# Patient Record
Sex: MALE | Race: WHITE | NOT HISPANIC OR LATINO | Employment: FULL TIME | URBAN - METROPOLITAN AREA
[De-identification: names, ages, dates, MRNs, and addresses within clinical notes are randomized per-mention and may not be internally consistent; named-entity substitution may affect disease eponyms.]

---

## 2017-08-03 ENCOUNTER — OFFICE VISIT - HEALTHEAST (OUTPATIENT)
Dept: FAMILY MEDICINE | Facility: CLINIC | Age: 27
End: 2017-08-03

## 2017-08-03 DIAGNOSIS — M25.512 LEFT SHOULDER PAIN: ICD-10-CM

## 2017-08-03 DIAGNOSIS — G47.33 OBSTRUCTIVE SLEEP APNEA: ICD-10-CM

## 2017-08-03 ASSESSMENT — MIFFLIN-ST. JEOR: SCORE: 1997.93

## 2017-10-09 ENCOUNTER — AMBULATORY - HEALTHEAST (OUTPATIENT)
Dept: NURSING | Facility: CLINIC | Age: 27
End: 2017-10-09

## 2017-10-09 DIAGNOSIS — Z23 IMMUNIZATION DUE: ICD-10-CM

## 2018-10-29 ENCOUNTER — COMMUNICATION - HEALTHEAST (OUTPATIENT)
Dept: FAMILY MEDICINE | Facility: CLINIC | Age: 28
End: 2018-10-29

## 2018-10-29 ENCOUNTER — OFFICE VISIT - HEALTHEAST (OUTPATIENT)
Dept: FAMILY MEDICINE | Facility: CLINIC | Age: 28
End: 2018-10-29

## 2018-10-29 DIAGNOSIS — E66.9 OBESE: ICD-10-CM

## 2018-10-29 DIAGNOSIS — Z00.00 ROUTINE GENERAL MEDICAL EXAMINATION AT A HEALTH CARE FACILITY: ICD-10-CM

## 2018-10-29 LAB
ALBUMIN SERPL-MCNC: 4.1 G/DL (ref 3.5–5)
ALP SERPL-CCNC: 54 U/L (ref 45–120)
ALT SERPL W P-5'-P-CCNC: 35 U/L (ref 0–45)
ANION GAP SERPL CALCULATED.3IONS-SCNC: 11 MMOL/L (ref 5–18)
AST SERPL W P-5'-P-CCNC: 23 U/L (ref 0–40)
BILIRUB SERPL-MCNC: 0.5 MG/DL (ref 0–1)
BUN SERPL-MCNC: 15 MG/DL (ref 8–22)
CALCIUM SERPL-MCNC: 9.9 MG/DL (ref 8.5–10.5)
CHLORIDE BLD-SCNC: 103 MMOL/L (ref 98–107)
CHOLEST SERPL-MCNC: 149 MG/DL
CO2 SERPL-SCNC: 27 MMOL/L (ref 22–31)
CREAT SERPL-MCNC: 0.99 MG/DL (ref 0.7–1.3)
FASTING STATUS PATIENT QL REPORTED: NO
GFR SERPL CREATININE-BSD FRML MDRD: >60 ML/MIN/1.73M2
GLUCOSE BLD-MCNC: 87 MG/DL (ref 70–125)
HBA1C MFR BLD: 5.4 % (ref 3.5–6)
HDLC SERPL-MCNC: 38 MG/DL
LDLC SERPL CALC-MCNC: 78 MG/DL
POTASSIUM BLD-SCNC: 3.7 MMOL/L (ref 3.5–5)
PROT SERPL-MCNC: 7.1 G/DL (ref 6–8)
SODIUM SERPL-SCNC: 141 MMOL/L (ref 136–145)
TRIGL SERPL-MCNC: 163 MG/DL
TSH SERPL DL<=0.005 MIU/L-ACNC: 1.03 UIU/ML (ref 0.3–5)

## 2018-10-29 ASSESSMENT — MIFFLIN-ST. JEOR: SCORE: 2013.6

## 2019-09-24 ENCOUNTER — OFFICE VISIT - HEALTHEAST (OUTPATIENT)
Dept: FAMILY MEDICINE | Facility: CLINIC | Age: 29
End: 2019-09-24

## 2019-09-24 DIAGNOSIS — Z13.220 LIPID SCREENING: ICD-10-CM

## 2019-09-24 DIAGNOSIS — Z13.1 ENCOUNTER FOR SCREENING EXAMINATION FOR IMPAIRED GLUCOSE REGULATION AND DIABETES MELLITUS: ICD-10-CM

## 2019-09-24 DIAGNOSIS — R03.0 ELEVATED BLOOD PRESSURE READING WITHOUT DIAGNOSIS OF HYPERTENSION: ICD-10-CM

## 2019-09-24 DIAGNOSIS — Z23 IMMUNIZATION DUE: ICD-10-CM

## 2019-09-24 DIAGNOSIS — Z00.00 ANNUAL PHYSICAL EXAM: ICD-10-CM

## 2019-09-24 LAB
CHOLEST SERPL-MCNC: 175 MG/DL
FASTING STATUS PATIENT QL REPORTED: YES
FASTING STATUS PATIENT QL REPORTED: YES
GLUCOSE BLD-MCNC: 78 MG/DL (ref 70–99)
HDLC SERPL-MCNC: 44 MG/DL
LDLC SERPL CALC-MCNC: 107 MG/DL
TRIGL SERPL-MCNC: 118 MG/DL

## 2019-09-24 ASSESSMENT — MIFFLIN-ST. JEOR: SCORE: 2035.63

## 2019-10-09 ENCOUNTER — AMBULATORY - HEALTHEAST (OUTPATIENT)
Dept: NURSING | Facility: CLINIC | Age: 29
End: 2019-10-09

## 2019-10-09 DIAGNOSIS — I10 BENIGN ESSENTIAL HYPERTENSION: ICD-10-CM

## 2019-10-18 ENCOUNTER — COMMUNICATION - HEALTHEAST (OUTPATIENT)
Dept: FAMILY MEDICINE | Facility: CLINIC | Age: 29
End: 2019-10-18

## 2020-07-01 ENCOUNTER — AMBULATORY - HEALTHEAST (OUTPATIENT)
Dept: FAMILY MEDICINE | Facility: CLINIC | Age: 30
End: 2020-07-01

## 2020-07-01 ENCOUNTER — COMMUNICATION - HEALTHEAST (OUTPATIENT)
Dept: FAMILY MEDICINE | Facility: CLINIC | Age: 30
End: 2020-07-01

## 2020-07-01 DIAGNOSIS — R21 RASH: ICD-10-CM

## 2021-01-04 ENCOUNTER — AMBULATORY - HEALTHEAST (OUTPATIENT)
Dept: NURSING | Facility: CLINIC | Age: 31
End: 2021-01-04

## 2021-01-25 ENCOUNTER — OFFICE VISIT - HEALTHEAST (OUTPATIENT)
Dept: FAMILY MEDICINE | Facility: CLINIC | Age: 31
End: 2021-01-25

## 2021-01-25 DIAGNOSIS — G47.33 OBSTRUCTIVE SLEEP APNEA: ICD-10-CM

## 2021-01-25 DIAGNOSIS — Z02.4 ENCOUNTER FOR CDL (COMMERCIAL DRIVING LICENSE) EXAM: ICD-10-CM

## 2021-01-25 DIAGNOSIS — I10 BENIGN ESSENTIAL HYPERTENSION: ICD-10-CM

## 2021-01-25 LAB
ALBUMIN UR-MCNC: NEGATIVE MG/DL
APPEARANCE UR: CLEAR
BILIRUB UR QL STRIP: NEGATIVE
COLOR UR AUTO: YELLOW
GLUCOSE UR STRIP-MCNC: NEGATIVE MG/DL
HGB UR QL STRIP: NEGATIVE
KETONES UR STRIP-MCNC: NEGATIVE MG/DL
LEUKOCYTE ESTERASE UR QL STRIP: NEGATIVE
NITRATE UR QL: NEGATIVE
PH UR STRIP: 5 [PH] (ref 5–8)
SP GR UR STRIP: >=1.03 (ref 1–1.03)
UROBILINOGEN UR STRIP-ACNC: NORMAL

## 2021-01-25 RX ORDER — LISINOPRIL 10 MG/1
10 TABLET ORAL DAILY
Qty: 30 TABLET | Refills: 11 | Status: SHIPPED | OUTPATIENT
Start: 2021-01-25 | End: 2022-08-08

## 2021-01-25 ASSESSMENT — MIFFLIN-ST. JEOR: SCORE: 2040.57

## 2021-05-26 VITALS — DIASTOLIC BLOOD PRESSURE: 88 MMHG | SYSTOLIC BLOOD PRESSURE: 142 MMHG

## 2021-05-27 ENCOUNTER — RECORDS - HEALTHEAST (OUTPATIENT)
Dept: ADMINISTRATIVE | Facility: CLINIC | Age: 31
End: 2021-05-27

## 2021-05-31 VITALS — WEIGHT: 229 LBS | BODY MASS INDEX: 32.06 KG/M2 | HEIGHT: 71 IN

## 2021-06-01 NOTE — PROGRESS NOTES
"Former[facility] Note    Name: Yoel Hughes  : 1990   MRN: [id number]    Yoel Hughes is a 29 y.o. male presenting to discuss the following:     CC:   Chief Complaint   Patient presents with     Biometric Screen     Fasting for labs.       HPI:  Yoel is here today for symmetric screening and annual physical exam.  He has no concerns.    Blood pressure slightly elevated today.  He has a history of sleep apnea, does not use his CPAP machine.  Denies any history of hypertension.    He quit to tobacco back in April.    HEALTH MAINTENANCE:   - flu shot: We will do flu shot today  - HIV screening: Declines HIV screening    ROS:   See HPI above. Remaining 14 point ROS negative.     PMH:   Patient Active Problem List   Diagnosis     Obstructive sleep apnea       No past medical history on file.    PSH:   No past surgical history on file.      MEDICATIONS:   No current outpatient medications on file prior to visit.     No current facility-administered medications on file prior to visit.        ALLERGIES:  No Known Allergies    FAMHx:  Family History   Problem Relation Age of Onset     Clotting disorder Father      Factor V Leiden deficiency Father      Colon cancer Maternal Grandmother      Cancer Maternal Grandfather      Lung cancer Maternal Grandfather        SOCIAL HISTORY:   Social History     Tobacco Use     Smoking status: Never Smoker     Smokeless tobacco: Former User     Types: Chew   Substance Use Topics     Alcohol use: Not on file     Drug use: Not on file       PHYSICAL EXAM:   /90   Pulse 88   Temp 97.8  F (36.6  C) (Oral)   Resp 16   Ht 5' 10.5\" (1.791 m)   Wt (!) 237 lb 5 oz (107.6 kg)   BMI 33.57 kg/m     GENERAL: Yoel is a pleasant, well appearing male, in no acute distress.   HEENT: Sclera white, no nasal discharge, oropharynx pink and moist, no cervical lymphadenopathy, no thyromegaly.   HEART: Regular rate and rhythm, no murmurs.   LUNGS: Clear to auscultation " bilaterally, unlabored.   ABDOMEN: Soft, non-tender to palpation.     ASSESSMENT & PLAN:   Yoel Hughes is a 29 y.o. male presenting today for annual physical exam / biometric screening.    1. Annual physical exam  Reviewed health history.  Reviewed health maintenance recommendations.    2. Elevated blood pressure reading without diagnosis of hypertension  Blood pressure is slightly elevated today.  He will return in 2 weeks for nurse only blood pressure check.  May be related to untreated sleep apnea.    3. Lipid screening  - Lipid [place] FASTING    4. Encounter for screening examination for impaired glucose regulation and diabetes mellitus  - Glucose    5. Immunization due  - Influenza, Seasonal Quad, PF =/> 6months     HM: Declined HIV screening    RTC: 2 weeks - nurse only BP check / 1 year - annual physical exam     Cielo Nur, DO

## 2021-06-01 NOTE — PROGRESS NOTES
Please call Duane,  His labs have returned and everything looks good. We will fax over the form.   Cielo Nur, DO

## 2021-06-02 VITALS — HEIGHT: 71 IN | BODY MASS INDEX: 32.48 KG/M2 | WEIGHT: 232 LBS

## 2021-06-02 NOTE — PROGRESS NOTES
I met with Yoel Hughes at the request of Dr. Nur  to recheck his blood pressure.  Blood pressure medications on the MAR were reviewed with patient.    Patient has taken all medications as per usual regimen: N/A  Patient reports tolerating them without any issues or concerns: N/A    Vitals:    10/09/19 0912 10/09/19 0922   BP: (!) 152/94 142/88       After 5 minutes, the patient's blood pressure remained greater than or equal to 140/90.    Is the patient currently having any chest pain?  No  Does the patient currently have a headache?   No  Does the patient currently have any vision changes? No  Does the patient currently have any nausea? No  Does the patient currently have any abdominal pain? No    The previous encounter was reviewed.  The patient was discharged and the note will be sent to the provider for final review.

## 2021-06-02 NOTE — TELEPHONE ENCOUNTER
----- Message from Cielo Nur DO sent at 10/18/2019  7:36 AM CDT -----  Please call Yoel.   Thank you for coming back into the clinic to recheck your blood pressure.  Your blood pressure does continue to be elevated and I recommend starting a blood pressure lowering medication.  I have sent in the medication to your pharmacy to start taking daily to help lower your blood pressure.  This medication is called lisinopril.  I recommend returning in 1 month for another nurse only appointment to recheck your blood pressure.  We should also check your kidney function and electrolytes at that time to make sure they are still in the normal range.  Please let me know if you have any other questions or concerns.  I will place an order for that future lab appointment.  Cielo Nur DO  ----- Message -----  From: Lenora Hyman CMA  Sent: 10/9/2019   9:24 AM CDT  To: Cielo Nur DO

## 2021-06-02 NOTE — TELEPHONE ENCOUNTER
Called pt and relayed Dr. Nur's message below.  Pt was ok with calling back in to schedule a nurse only and lab only appt once he starts taking the Lisinopril. Pt was notified that the prescription was sent to his pharmacy.

## 2021-06-03 VITALS
SYSTOLIC BLOOD PRESSURE: 134 MMHG | HEART RATE: 88 BPM | HEIGHT: 71 IN | WEIGHT: 237.31 LBS | RESPIRATION RATE: 16 BRPM | BODY MASS INDEX: 33.22 KG/M2 | TEMPERATURE: 97.8 F | DIASTOLIC BLOOD PRESSURE: 90 MMHG

## 2021-06-05 VITALS
HEART RATE: 77 BPM | BODY MASS INDEX: 33.38 KG/M2 | DIASTOLIC BLOOD PRESSURE: 90 MMHG | RESPIRATION RATE: 16 BRPM | WEIGHT: 238.4 LBS | HEIGHT: 71 IN | SYSTOLIC BLOOD PRESSURE: 144 MMHG

## 2021-06-09 NOTE — TELEPHONE ENCOUNTER
Called and reviewed with patient.  He has had a recent rash on his trunk and on his extremities.  He states that this can be mildly itchy.  He was doing a lot of yard work recently.  He denies fever, chills, or blisters.  Recommend a trial of topical triamcinolone.  Discussed that this could represent a fungal infection as well.  May need to consider further evaluation or treatment if this persists.  He agrees to plan.

## 2021-06-12 NOTE — PROGRESS NOTES
Assessment/ Plan     1. Left shoulder pain  Consider possible rotator cuff tendinitis  Consider also possible cervical radiculopathy    Recommend conservative measures initially  He may take ibuprofen 600 mg-800 mg up to 3 times a day in the short-term  He may apply ice or cool packs  Recommend minimizing activities such as lifting his arm above his head  If not improving consider course of physical therapy.  If still not improving then he may need referral to orthopedics or possibly further evaluation with an MRI    2. Obstructive sleep apnea    Recommend that records be forwarded  Can consider CPAP as needed    3. Family history of blood clots and Factor V Leiden trait.    Offered to check a factor V Leiden test in the future      Subjective:       Yoel Hughes is a 27 y.o. male who presents to the clinic to establish care.  Essentially, he has had at least a one-month history of pain in the left shoulder.  He notes that he has had some pain when elevating his left arm above his head.  There are times that when sleeping he will have an increased discomfort, especially when lying on his left side.  He is quite active in his job as a .  He will have occasional clicking in his left shoulder.  Also, he has had occasional numbness and tingling involving the left fourth finger.  He cannot think of a specific injury.  He has taken Tylenol for some of the discomfort.  Past medical history is notable for previous bilateral arm fractures.  He has history of obstructive sleep apnea.  He is not currently taking medications.  He denies known allergies.  Social history is notable for the fact that he is  and expecting his first child.  He works as a  as noted.    The following portions of the patient's history were reviewed and updated as appropriate: allergies, current medications, past family history, past medical history, past social history, past surgical history and problem list.    Review of  "Systems   A 12 point comprehensive review of systems was negative except as noted.      No current outpatient prescriptions on file.     No current facility-administered medications for this visit.        Objective:      /82  Pulse 68  Temp 98.3  F (36.8  C) (Oral)   Resp 20  Ht 5' 10.5\" (1.791 m)  Wt (!) 229 lb (103.9 kg)  BMI 32.39 kg/m2      General appearance: alert, appears stated age and cooperative  Head: Normocephalic, without obvious abnormality, atraumatic  Throat: lips, mucosa, and tongue normal; teeth and gums normal  Lungs: clear to auscultation bilaterally  Heart: regular rate and rhythm, S1, S2 normal, no murmur, click, rub or gallop  Extremities: extremities normal, atraumatic, no cyanosis or edema   Examination left shoulder reveals normal range of motion  He has some pain with left arm abduction  There are no obvious impingement signs  Lymph nodes: Cervical, supraclavicular, and axillary nodes normal.  Neurologic: Alert and oriented X 3, normal strength and tone.Normal coordination and gait         No results found for this or any previous visit (from the past 168 hour(s)).       This note has been dictated using voice recognition software. Any grammatical or context distortions are unintentional and inherent to the software  "

## 2021-06-14 NOTE — PROGRESS NOTES
Medical Examination      Assessment/Plan     1. Encounter for CDL (commercial driving license) exam  Tatiana meets criteria for a 1 year recertification due to hypertension and sleep apnea.  Appropriate forms are filled out and he is logged onto the national registry  - Urinalysis- if Indicated    2. Benign essential hypertension  He has not been taking his blood pressure medication.  Discussed he would have much better outcomes long-term if he treated his blood pressure.  Also, he needs to have this under better control for his DOT.  - lisinopriL (PRINIVIL,ZESTRIL) 10 MG tablet; Take 1 tablet (10 mg total) by mouth daily.  Dispense: 30 tablet; Refill: 11    3. Obstructive sleep apnea  He has a history of sleep apnea and is not using CPAP.  I discussed with him the importance of keeping his DOT and being compliant with CPAP.  We will give him a 1 year certification but he needs to reestablish with a sleep medicine physician and get started on CPAP if that is what they recommended.  I discussed he needs to show 70% compliance with CPAP at his next recertification.  - Ambulatory referral to Neurology  Subjective:      Yoel Hughes is a 30 y.o. male who presents today for a  fitness determination physical exam.  He is new to me today.  She has a history of hypertension and was given a prescription for lisinopril which he never filled.  He states he was nervous about taking medication.  He does have a family history of hypertension.  I discussed with him the importance of treating hypertension over time and specifically with his DOT.  He also was diagnosed with sleep apnea about 5 years ago.  He was told it was mild but they did recommend CPAP which she did wear for short period of time.  He states he does snore fairly loudly.  He does have some daytime sleepiness but does not feel that it severe.  I discussed with him the guidelines through the DOT with a diagnosis of sleep apnea  "having to use CPAP at least 70% of the time.  He agrees to go back and see the sleep specialist that initially did his evaluation to either get set up on CPAP or have any test.  The following portions of the patient's history were reviewed and updated as appropriate: allergies, current medications, past family history, past medical history, past social history, past surgical history and problem list.  Review of Systems  A 12 point comprehensive review of systems was negative except as noted.     Objective:      Vision:   Uncorrected Corrected Horizontal Field of Vision   Right Eye 20/25  170 degrees   Left Eye  20/20  170 degrees   Both Eyes  20/16       Applicant can recognize and distinguish among traffic control signals and devices showing standard red, green, and maynor colors.         Monocular Vision?: No      Hearin Hz 1000 Hz 2000 Hz 4000 Hz   Right Ear  hears 25 dB hears 20 dB hears 20 dB no response at 40 dB   Left Ear  hears 25 dB hears 20 dB hears 20 dB hears 20 dB          /90   Pulse 77   Resp 16   Ht 5' 10.5\" (1.791 m)   Wt (!) 238 lb 6.4 oz (108.1 kg)   BMI 33.72 kg/m      General Appearance:    Alert, cooperative, no distress, appears stated age   Head:    Normocephalic, without obvious abnormality, atraumatic   Eyes:    PERRL, conjunctiva/corneas clear, EOM's intact, fundi     benign, both eyes        Ears:    Normal TM's and external ear canals, both ears   Nose:   Nares normal, septum midline, mucosa normal, no drainage    or sinus tenderness   Throat:   Lips, mucosa, and tongue normal; teeth and gums normal   Neck:   Supple, symmetrical, trachea midline, no adenopathy;        thyroid:  No enlargement/tenderness/nodules; no carotid    bruit or JVD   Back:     Symmetric, no curvature, ROM normal, no CVA tenderness   Lungs:     Clear to auscultation bilaterally, respirations unlabored   Chest wall:    No tenderness or deformity   Heart:    Regular rate and rhythm, S1 and S2 " normal, no murmur, rub   or gallop   Abdomen:     Soft, non-tender, bowel sounds active all four quadrants,     no masses, no organomegaly           Extremities:   Extremities normal, atraumatic, no cyanosis or edema   Pulses:   2+ and symmetric all extremities   Skin:   Skin color, texture, turgor normal, no rashes or lesions   Lymph nodes:   Cervical, supraclavicular, and axillary nodes normal   Neurologic:   CNII-XII intact. Normal strength, sensation and reflexes       throughout       Labs:  No results found for: SPECGRAV, PROTEINUR, BILIRUBINUR, GLUCOSEU

## 2021-06-16 PROBLEM — I10 BENIGN ESSENTIAL HYPERTENSION: Status: ACTIVE | Noted: 2021-01-25

## 2021-06-16 PROBLEM — G47.33 OBSTRUCTIVE SLEEP APNEA: Status: ACTIVE | Noted: 2017-08-03

## 2021-06-16 PROBLEM — R03.0 ELEVATED BLOOD PRESSURE READING WITHOUT DIAGNOSIS OF HYPERTENSION: Status: ACTIVE | Noted: 2019-09-24

## 2021-06-21 NOTE — PROGRESS NOTES
"[facility] Clinic Note    Patient Name: Yoel Hughes  Patient Age: 28 y.o.  YOB: 1990  MRN: [id number]    Date of visit: 10/29/2018    Patient Active Problem List   Diagnosis     Obstructive sleep apnea     Social History     Social History Narrative     Family History   Problem Relation Age of Onset     Clotting disorder Father      Factor V Leiden deficiency Father      Colon cancer Maternal Grandmother      Cancer Maternal Grandfather      Lung cancer Maternal Grandfather      No past surgical history on file.  No outpatient encounter prescriptions on file as of 10/29/2018.     No facility-administered encounter medications on file as of 10/29/2018.        Chief Complaint:   Chief Complaint   Patient presents with     Annual Exam       HPI:   Occupation: facility  Marital status:  Number of children:1  Living situation:3  Diet:not great  Exercise:2 hockey a week.    Alcohol use:not often  Tobacco use:chews, weaning off.    Illicit drug use:no  Depression:ok  Last tetanus:2017  HIV testing:never had  Lipids/glucose:ok  Blood pressure:ok  Mother/Father/Siblings MI:no  Fam hx colon or prostate cancer or kidney disease:gma colon cancer  Spirituality:no  Last visit to dentist:been awhile  Optometrist:vision test every 2 years    Urine ok          Wt Readings from Last 3 Encounters:   10/29/18 (!) 232 lb (105.2 kg)   08/03/17 (!) 229 lb (103.9 kg)     BP Readings from Last 3 Encounters:   10/29/18 130/78   08/03/17 130/82       ROS: Pertinent ros findings in hpi, all other systems negative.  Objective/Physical Exam:     /78  Pulse 68  Temp 97.7  F (36.5  C) (Oral)   Resp 12  Ht 5' 10.63\" (1.794 m)  Wt (!) 232 lb (105.2 kg)  BMI 32.7 kg/m2    Gen: NAD, conversant, appears age, well-kempt  Skin: warm, dry, no rash, pallor cyanosis  HENT: normocephalic atraumatic, MMM, no oral lesions, otorrhea, rhinorrhea. TM's normal bilaterally.  Eyes: non-icteric, extra-ocular movements " intact, PERRL, conjunctivae not injected. Holding eyes open comfortably, no drainage.  CV: NRRR no m/r/g, no peripheral edema. no JVD.  Resp: CTAB no w/r/r, normal respiratory effort  GI: soft, non-tender, non-distended. No masses.  MSK: no muscle or joint swelling.  Neuro: no dysarthria or gross asymmetry  Psych: full affect, oriented x 3  Lymph: No significant cervical lymphadenopathy  Hematologic: No petechiae or purpura.  gu ok    Has some tooth decay.  Receding gums.  No evidence of cancer.  Assessment/Plan:  No results found for this or any previous visit (from the past 24 hour(s)).    Wt Readings from Last 3 Encounters:   10/29/18 (!) 232 lb (105.2 kg)   08/03/17 (!) 229 lb (103.9 kg)     BP Readings from Last 3 Encounters:   10/29/18 130/78   08/03/17 130/82       PHQ-2 Total Score: 0 (10/29/2018  9:46 AM)  No Data Recorded          ICD-10-CM    1. Routine general medical examination at a health care facility Z00.00 Glycosylated Hemoglobin A1c     Lipid [place]     Comprehensive Metabolic Panel     Thyroid Stimulating Hormone (TSH)   2. Obese E66.9 Glycosylated Hemoglobin A1c     Lipid [place]     Comprehensive Metabolic Panel     Thyroid Stimulating Hormone (TSH)     No medications were ordered this encounter  We discussed going to the dentist, weight loss, stopping chewing tobacco. Using cpap    Patient Instructions   Plant Based Diet Handout    Eat abundant vegetables.    Only eat whole grains.    2-4 fruits/day.    Enjoy at least 2 servings of legumes (beans) or tofu daily.      Avoid animal products such as dairy, eggs and meat. (Replace these with 1,000mcg vitamin B12 and a calcium/vitamin D supplement such as Caltrate+D3 daily.)    Avoid processed foods, sugars and oils.    Cook your own food as much as possible.    Keep a food diary and ask someone else to diet with you.    Drink 8 glasses of water a day.    [place] [employer] Documentary - watch online.        Counseled patient regarding healthy  lifestyle including exercise, healthy eating. I recommend seeing optometrist and dentist regularly.    Counseled patient regarding treatments, treatment options, risks and benefits and diagnosis.  The patient was interactive, attentive, verbalized understanding, and we discussed plan.     Vj Tellez MD

## 2021-07-03 NOTE — ADDENDUM NOTE
Addendum Note by Fareed Nur DO at 10/9/2019  9:00 AM     Author: Fareed Nur DO Service: -- Author Type: Physician    Filed: 10/18/2019  7:38 AM Encounter Date: 10/9/2019 Status: Signed    : Fareed Nur DO (Physician)    Addended by: FAREED NUR on: 10/18/2019 07:38 AM        Modules accepted: Orders

## 2021-07-03 NOTE — ADDENDUM NOTE
Addendum Note by Fareed Nur DO at 10/9/2019  9:00 AM     Author: Fareed Nur DO Service: -- Author Type: Physician    Filed: 10/18/2019  7:36 AM Encounter Date: 10/9/2019 Status: Signed    : Fareed Nur DO (Physician)    Addended by: FAREED NUR on: 10/18/2019 07:36 AM        Modules accepted: Orders

## 2022-03-04 ENCOUNTER — TRANSFERRED RECORDS (OUTPATIENT)
Dept: HEALTH INFORMATION MANAGEMENT | Facility: CLINIC | Age: 32
End: 2022-03-04

## 2022-03-22 ENCOUNTER — TRANSFERRED RECORDS (OUTPATIENT)
Dept: HEALTH INFORMATION MANAGEMENT | Facility: CLINIC | Age: 32
End: 2022-03-22

## 2022-08-01 ENCOUNTER — OFFICE VISIT (OUTPATIENT)
Dept: FAMILY MEDICINE | Facility: CLINIC | Age: 32
End: 2022-08-01
Payer: COMMERCIAL

## 2022-08-01 VITALS
OXYGEN SATURATION: 96 % | WEIGHT: 226 LBS | HEIGHT: 71 IN | TEMPERATURE: 98.3 F | HEART RATE: 73 BPM | SYSTOLIC BLOOD PRESSURE: 142 MMHG | DIASTOLIC BLOOD PRESSURE: 82 MMHG | BODY MASS INDEX: 31.64 KG/M2 | RESPIRATION RATE: 16 BRPM

## 2022-08-01 DIAGNOSIS — Z01.818 PREOP GENERAL PHYSICAL EXAM: Primary | ICD-10-CM

## 2022-08-01 DIAGNOSIS — G47.33 OBSTRUCTIVE SLEEP APNEA: ICD-10-CM

## 2022-08-01 LAB — HGB BLD-MCNC: 14.6 G/DL (ref 13.3–17.7)

## 2022-08-01 PROCEDURE — 99213 OFFICE O/P EST LOW 20 MIN: CPT | Mod: 25 | Performed by: NURSE PRACTITIONER

## 2022-08-01 PROCEDURE — U0003 INFECTIOUS AGENT DETECTION BY NUCLEIC ACID (DNA OR RNA); SEVERE ACUTE RESPIRATORY SYNDROME CORONAVIRUS 2 (SARS-COV-2) (CORONAVIRUS DISEASE [COVID-19]), AMPLIFIED PROBE TECHNIQUE, MAKING USE OF HIGH THROUGHPUT TECHNOLOGIES AS DESCRIBED BY CMS-2020-01-R: HCPCS | Performed by: NURSE PRACTITIONER

## 2022-08-01 PROCEDURE — 36415 COLL VENOUS BLD VENIPUNCTURE: CPT | Performed by: NURSE PRACTITIONER

## 2022-08-01 PROCEDURE — 85018 HEMOGLOBIN: CPT | Performed by: NURSE PRACTITIONER

## 2022-08-01 PROCEDURE — U0005 INFEC AGEN DETEC AMPLI PROBE: HCPCS | Performed by: NURSE PRACTITIONER

## 2022-08-01 NOTE — PATIENT INSTRUCTIONS
Take Lisinopril the morning of surgery.   Avoid Aspirin, ibuprofen, Aleve until after surgery.   Preparing for Your Surgery  Getting started  A nurse will call you to review your health history and instructions. They will give you an arrival time based on your scheduled surgery time. Please be ready to share:  Your doctor's clinic name and phone number  Your medical, surgical and anesthesia history  A list of allergies and sensitivities  A list of medicines, including herbal treatments and over-the-counter drugs  Whether the patient has a legal guardian (ask how to send us the papers in advance)  Please tell us if you're pregnant--or if there's any chance you might be pregnant. Some surgeries may injure a fetus (unborn baby), so they require a pregnancy test. Surgeries that are safe for a fetus don't always need a test, and you can choose whether to have one.   If you have a child who's having surgery, please ask for a copy of Preparing for Your Child's Surgery.    Preparing for surgery  Within 30 days of surgery: Have a pre-op exam (sometimes called an H&P, or History and Physical). This can be done at a clinic or pre-operative center.  If you're having a , you may not need this exam. Talk to your care team.  At your pre-op exam, talk to your care team about all medicines you take. If you need to stop any medicines before surgery, ask when to start taking them again.  We do this for your safety. Many medicines can make you bleed too much during surgery. Some change how well surgery (anesthesia) drugs work.  Call your insurance company to let them know you're having surgery. (If you don't have insurance, call [phone].)  Call your clinic if there's any change in your health. This includes signs of a cold or flu (sore throat, runny nose, cough, rash, fever). It also includes a scrape or scratch near the surgery site.  If you have questions on the day of surgery, call your hospital or surgery  center.  COVID testing  You may need to be tested for COVID-19 before having surgery. If so, we will give you instructions.  Eating and drinking guidelines  For your safety: Unless your surgeon tells you otherwise, follow the guidelines below.  Eat and drink as usual until 8 hours before surgery. After that, no food or milk.  Drink clear liquids until 2 hours before surgery. These are liquids you can see through, like water, Gatorade and Propel Water. You may also have black coffee and tea (no cream or milk).  Nothing by mouth within 2 hours of surgery. This includes gum, candy and breath mints.  If you drink alcohol: Stop drinking it the night before surgery.  If your care team tells you to take medicine on the morning of surgery, it's okay to take it with a sip of water.  Preventing infection  Shower or bathe the night before and morning of your surgery. Follow the instructions your clinic gave you. (If no instructions, use regular soap.)  Don't shave or clip hair near your surgery site. We'll remove the hair if needed.  Don't smoke or vape the morning of surgery. You may chew nicotine gum up to 2 hours before surgery. A nicotine patch is okay.  Note: Some surgeries require you to completely quit smoking and nicotine. Check with your surgeon.  Your care team will make every effort to keep you safe from infection. We will:  Clean our hands often with soap and water (or an alcohol-based hand rub).  Clean the skin at your surgery site with a special soap that kills germs.  Give you a special gown to keep you warm. (Cold raises the risk of infection.)  Wear special hair covers, masks, gowns and gloves during surgery.  Give antibiotic medicine, if prescribed. Not all surgeries need antibiotics.  What to bring on the day of surgery  Photo ID and insurance card  Copy of your health care directive, if you have one  Glasses and hearing aides (bring cases)  You can't wear contacts during surgery  Inhaler and eye drops, if  you use them (tell us about these when you arrive)  CPAP machine or breathing device, if you use them  A few personal items, if spending the night  If you have . . .  A pacemaker, ICD (cardiac defibrillator) or other implant: Bring the ID card.  An implanted stimulator: Bring the remote control.  A legal guardian: Bring a copy of the certified (court-stamped) guardianship papers.  Please remove any jewelry, including body piercings. Leave jewelry and other valuables at home.  If you're going home the day of surgery  You must have a responsible adult drive you home. They should stay with you overnight as well.  If you don't have someone to stay with you, and you aren't safe to go home alone, we may keep you overnight. Insurance often won't pay for this.  After surgery  If it's hard to control your pain or you need more pain medicine, please call your surgeon's office.  Questions?   If you have any questions for your care team, list them here: _________________________________________________________________________________________________________________________________________________________________________ ____________________________________ ____________________________________ ____________________________________  For informational purposes only. Not to replace the advice of your health care provider. Copyright   2003, 2019 [facility]. All rights reserved. Clinically reviewed by Hermelinda Sheets MD. [employer] [id number] - REV 07/21.

## 2022-08-01 NOTE — PROGRESS NOTES
[facility]  [address] [facility] [address]  Phone: [phone]  Primary Provider: Otis Aguiar  Pre-op Performing Provider: JAIRO LOWRY      PREOPERATIVE EVALUATION:  Today's date: 8/1/2022    Yoel Hughes is a 32 year old male who presents for a preoperative evaluation.    Surgical Information:  Surgery/Procedure: Triple UP - tonsils and throat procedures  Surgery Location: [place], [place]  Surgeon: Dr Albert  Surgery Date: 08/4/22  Time of Surgery: am  Where patient plans to recover: At home with family  Fax number for surgical facility: Note does not need to be faxed, will be available electronically in Epic.    Type of Anesthesia Anticipated: General    Assessment & Plan     The proposed surgical procedure is considered INTERMEDIATE risk.    Preop general physical exam  Scheduled for surgery on August 4th, 2022  - Hemoglobin; Future  - Asymptomatic COVID-19 Virus (Coronavirus) by PCR; Future  - Asymptomatic COVID-19 Virus (Coronavirus) by PCR  - Hemoglobin    Obstructive sleep apnea  Isn't able to tolerate a CPAP machine so he is going to have surgery to open up his airway to hopefully improve sleep apnea.           Risks and Recommendations:  The patient has the following additional risks and recommendations for perioperative complications:  Obstructive Sleep Apnea:   Isn't able to tolerate a CPAP machine    Medication Instructions:  Patient is to take all scheduled medications on the day of surgery    RECOMMENDATION:  APPROVAL GIVEN to proceed with proposed procedure, without further diagnostic evaluation.    Subjective     HPI related to upcoming procedure: has sleep apnea and isn't able to tolerate CPAP machine so he is having surgery to remove the uvula shave down the turbinates, take out the tonsils and adenoids.  With the hopes that his sleep apnea improves.    Preop Questions 8/1/2022   1. Have you ever had a heart attack or stroke? No   2.  Have you ever had surgery on your heart or blood vessels, such as a stent placement, a coronary artery bypass, or surgery on an artery in your head, neck, heart, or legs? No   3. Do you have chest pain with activity? No   4. Do you have a history of  heart failure? No   5. Do you currently have a cold, bronchitis or symptoms of other infection? No   6. Do you have a cough, shortness of breath, or wheezing? No   7. Do you or anyone in your family have previous history of blood clots? YES - Dad has factor V   8. Do you or does anyone in your family have a serious bleeding problem such as prolonged bleeding following surgeries or cuts? UNKNOWN    9. Have you ever had problems with anemia or been told to take iron pills? No   10. Have you had any abnormal blood loss such as black, tarry or bloody stools? No   11. Have you ever had a blood transfusion? No   12. Are you willing to have a blood transfusion if it is medically needed before, during, or after your surgery? Yes   13. Have you or any of your relatives ever had problems with anesthesia? UNKNOWN - not that he remembers   14. Do you have sleep apnea, excessive snoring or daytime drowsiness? YES - doesn't use a CPAP    14a. Do you have a CPAP machine? Yes   15. Do you have any artifical heart valves or other implanted medical devices like a pacemaker, defibrillator, or continuous glucose monitor? No   16. Do you have artificial joints? No   17. Are you allergic to latex? No       Health Care Directive:  Patient does not have a Health Care Directive or Living Will: Discussed advance care planning with patient; however, patient declined at this time.    Preoperative Review of :   reviewed - no record of controlled substances prescribed.      Status of Chronic Conditions:  HYPERTENSION - Patient has longstanding history of HTN , currently denies any symptoms referable to elevated blood pressure. Specifically denies chest pain, palpitations, dyspnea, orthopnea,  PND or peripheral edema. Blood pressure readings have not been in normal range. Current medication regimen is as listed below. Patient denies any side effects of medication.  He is not taking it consistently.  Recommended that he take it consistently every day until his surgery.    SLEEP PROBLEM - Patient has a longstanding history of fatigue and obstructive sleep apnea.. Patient has tried OTC medications with limited success.       Review of Systems  CONSTITUTIONAL: NEGATIVE for fever, chills, change in weight  INTEGUMENTARY/SKIN: NEGATIVE for worrisome rashes, moles or lesions  EYES: NEGATIVE for vision changes or irritation  ENT/MOUTH: NEGATIVE for ear, mouth and throat problems  RESP: NEGATIVE for significant cough or SOB  CV: NEGATIVE for chest pain, palpitations or peripheral edema  GI: NEGATIVE for nausea, abdominal pain, heartburn, or change in bowel habits  : NEGATIVE for frequency, dysuria, or hematuria  MUSCULOSKELETAL: NEGATIVE for significant arthralgias or myalgia  NEURO: NEGATIVE for weakness, dizziness or paresthesias  ENDOCRINE: NEGATIVE for temperature intolerance, skin/hair changes  HEME: NEGATIVE for bleeding problems  PSYCHIATRIC: NEGATIVE for changes in mood or affect    Patient Active Problem List    Diagnosis Date Noted     Benign essential hypertension 01/25/2021     Priority: Medium     Elevated blood pressure reading without diagnosis of hypertension 09/24/2019     Priority: Medium     Obstructive sleep apnea 08/03/2017     Priority: Medium      No past medical history on file.  No past surgical history on file.  Current Outpatient Medications   Medication Sig Dispense Refill     lisinopriL (PRINIVIL,ZESTRIL) 10 MG tablet [LISINOPRIL (PRINIVIL,ZESTRIL) 10 MG TABLET] Take 1 tablet (10 mg total) by mouth daily. 30 tablet 11       No Known Allergies     Social History     Tobacco Use     Smoking status: Never Smoker     Smokeless tobacco: Current User     Types: Chew     Last attempt to  "quit: 4/15/2019   Substance Use Topics     Alcohol use: Not on file     Family History   Problem Relation Age of Onset     Clotting Disorder Father      Factor V Leiden deficiency Father      Colon Cancer Maternal Grandmother      Cancer Maternal Grandfather      Lung Cancer Maternal Grandfather      History   Drug Use Not on file         Objective     BP (!) 142/82 (BP Location: Right arm, Patient Position: Sitting, Cuff Size: Adult Large)   Pulse 73   Temp 98.3  F (36.8  C) (Tympanic)   Resp 16   Ht 1.791 m (5' 10.5\")   Wt 102.5 kg (226 lb)   SpO2 96%   BMI 31.97 kg/m      Physical Exam    GENERAL APPEARANCE: healthy, alert and no distress     EYES: EOMI, PERRL     HENT: ear canals and TM's normal and nose and mouth without ulcers or lesions     NECK: no adenopathy, no asymmetry, masses, or scars and thyroid normal to palpation     RESP: lungs clear to auscultation - no rales, rhonchi or wheezes     CV: regular rates and rhythm, normal S1 S2, no S3 or S4 and no murmur, click or rub     ABDOMEN:  soft, nontender, no HSM or masses and bowel sounds normal     MS: extremities normal- no gross deformities noted, no evidence of inflammation in joints, FROM in all extremities.     SKIN: no suspicious lesions or rashes     NEURO: Normal strength and tone, sensory exam grossly normal, mentation intact and speech normal     PSYCH: mentation appears normal. and affect normal/bright     LYMPHATICS: No cervical adenopathy    No results for input(s): HGB, PLT, INR, NA, POTASSIUM, CR, A1C in the last 16040 hours.     Diagnostics:  Labs pending at this time.  Results will be reviewed when available.       Revised Cardiac Risk Index (RCRI):  The patient has the following serious cardiovascular risks for perioperative complications:   - No serious cardiac risks = 0 points     RCRI Interpretation: 0 points: Class I (very low risk - 0.4% complication rate)           Signed Electronically by: JACQUELINE Mcintyre CNP  Copy " of this evaluation report is provided to requesting physician.

## 2022-08-02 LAB — SARS-COV-2 RNA RESP QL NAA+PROBE: NEGATIVE
